# Patient Record
Sex: FEMALE | Race: ASIAN | NOT HISPANIC OR LATINO | URBAN - METROPOLITAN AREA
[De-identification: names, ages, dates, MRNs, and addresses within clinical notes are randomized per-mention and may not be internally consistent; named-entity substitution may affect disease eponyms.]

---

## 2017-05-01 ENCOUNTER — EMERGENCY (EMERGENCY)
Facility: HOSPITAL | Age: 32
LOS: 1 days | Discharge: HOME | End: 2017-05-01
Attending: EMERGENCY MEDICINE | Admitting: EMERGENCY MEDICINE
Payer: COMMERCIAL

## 2017-05-01 VITALS
HEART RATE: 115 BPM | SYSTOLIC BLOOD PRESSURE: 99 MMHG | OXYGEN SATURATION: 98 % | TEMPERATURE: 97 F | RESPIRATION RATE: 16 BRPM | DIASTOLIC BLOOD PRESSURE: 67 MMHG

## 2017-05-01 VITALS — HEART RATE: 87 BPM

## 2017-05-01 PROCEDURE — 99283 EMERGENCY DEPT VISIT LOW MDM: CPT

## 2017-05-01 PROCEDURE — 73630 X-RAY EXAM OF FOOT: CPT | Mod: 26,LT

## 2017-05-01 PROCEDURE — 73610 X-RAY EXAM OF ANKLE: CPT | Mod: 26,LT

## 2017-05-01 NOTE — ED PROVIDER NOTE - PROGRESS NOTE DETAILS
ROWDY Pitt RW - received pt s/p Quids eval by Dr solis. s/p lt ankle injury 2 wks ago, still in pain, xarys to r/o fx pending; if neg will dc with ortho/podiatry f/u.

## 2017-05-01 NOTE — ED PROVIDER NOTE - CARE PLAN
Principal Discharge DX:	Ankle pain  Instructions for follow-up, activity and diet:	PLEASE FOLLOW UP WITH AN ORTHOPEDIC DOCTOR OR PODIATRIST AS DISCUSSED, YOU MAY TAKE OVER-THE-COUNTER IBUPROFEN 600MG EVERY 8HRS AS NEEDED FOR THE PAIN. RETURN TO ER FOR SENSATION LOSS, BLUE DISCOLORATION OF TOES, FEVER, REDNESS TO THE AREA.

## 2017-05-01 NOTE — ED PROVIDER NOTE - NS ED MD SCRIBE ATTENDING SCRIBE SECTIONS
PHYSICAL EXAM/VITAL SIGNS( Pullset)/HISTORY OF PRESENT ILLNESS/REVIEW OF SYSTEMS/PAST MEDICAL/SURGICAL/SOCIAL HISTORY/DISPOSITION/HIV

## 2017-05-01 NOTE — ED PROVIDER NOTE - PLAN OF CARE
PLEASE FOLLOW UP WITH AN ORTHOPEDIC DOCTOR OR PODIATRIST AS DISCUSSED, YOU MAY TAKE OVER-THE-COUNTER IBUPROFEN 600MG EVERY 8HRS AS NEEDED FOR THE PAIN. RETURN TO ER FOR SENSATION LOSS, BLUE DISCOLORATION OF TOES, FEVER, REDNESS TO THE AREA.

## 2017-05-01 NOTE — ED ADULT TRIAGE NOTE - CHIEF COMPLAINT QUOTE
left foot trauma 2 weeks ago.  [facility] and had [facility] performed.  As per pt told it was sprained, f/u with orthopedics who also told pt that the foot was sprained.  As per pt showed her foot to her uncle who is an ED physician who told her to go to ED for x ray.